# Patient Record
Sex: MALE | Race: OTHER | NOT HISPANIC OR LATINO
[De-identification: names, ages, dates, MRNs, and addresses within clinical notes are randomized per-mention and may not be internally consistent; named-entity substitution may affect disease eponyms.]

---
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
## 2025-04-17 ENCOUNTER — APPOINTMENT (OUTPATIENT)
Age: 20
End: 2025-04-17

## 2025-04-17 PROCEDURE — D8670D: CUSTOM

## 2025-05-16 ENCOUNTER — APPOINTMENT (OUTPATIENT)
Age: 20
End: 2025-05-16
Payer: COMMERCIAL

## 2025-05-16 PROCEDURE — D0120: CPT

## 2025-05-16 PROCEDURE — D0274: CPT

## 2025-06-05 ENCOUNTER — APPOINTMENT (OUTPATIENT)
Age: 20
End: 2025-06-05

## 2025-06-26 ENCOUNTER — APPOINTMENT (OUTPATIENT)
Age: 20
End: 2025-06-26
Payer: COMMERCIAL

## 2025-06-26 PROCEDURE — D0350: CPT

## 2025-06-26 PROCEDURE — D0340: CPT

## 2025-06-26 PROCEDURE — D8660: CPT

## 2025-06-26 PROCEDURE — D0330 PANORAMIC RADIOGRAPHIC IMAGE: CPT

## 2025-06-26 PROCEDURE — D0470 DIAGNOSTIC CASTS: CPT
